# Patient Record
Sex: FEMALE | Race: WHITE | NOT HISPANIC OR LATINO | Employment: OTHER | URBAN - NONMETROPOLITAN AREA
[De-identification: names, ages, dates, MRNs, and addresses within clinical notes are randomized per-mention and may not be internally consistent; named-entity substitution may affect disease eponyms.]

---

## 2021-01-07 ENCOUNTER — HISTORICAL (OUTPATIENT)
Dept: ADMINISTRATIVE | Facility: HOSPITAL | Age: 76
End: 2021-01-07

## 2021-01-22 ENCOUNTER — HISTORICAL (OUTPATIENT)
Dept: RADIOLOGY | Facility: HOSPITAL | Age: 76
End: 2021-01-22

## 2024-03-28 ENCOUNTER — OFFICE VISIT (OUTPATIENT)
Dept: OBSTETRICS AND GYNECOLOGY | Facility: CLINIC | Age: 79
End: 2024-03-28
Payer: MEDICARE

## 2024-03-28 VITALS
HEART RATE: 74 BPM | BODY MASS INDEX: 29.32 KG/M2 | SYSTOLIC BLOOD PRESSURE: 136 MMHG | HEIGHT: 65 IN | WEIGHT: 176 LBS | DIASTOLIC BLOOD PRESSURE: 74 MMHG

## 2024-03-28 DIAGNOSIS — N81.6 RECTOCELE: ICD-10-CM

## 2024-03-28 DIAGNOSIS — K46.9 ENTEROCELE: ICD-10-CM

## 2024-03-28 DIAGNOSIS — N76.3 CHRONIC VULVITIS: ICD-10-CM

## 2024-03-28 DIAGNOSIS — N39.3 SUI (STRESS URINARY INCONTINENCE, FEMALE): ICD-10-CM

## 2024-03-28 DIAGNOSIS — N81.11 CYSTOCELE, MIDLINE: ICD-10-CM

## 2024-03-28 DIAGNOSIS — N95.2 VAGINAL ATROPHY: ICD-10-CM

## 2024-03-28 DIAGNOSIS — Z01.419 WELL WOMAN EXAM WITH ROUTINE GYNECOLOGICAL EXAM: Primary | ICD-10-CM

## 2024-03-28 PROCEDURE — G0101 CA SCREEN;PELVIC/BREAST EXAM: HCPCS | Mod: GZ,S$GLB,, | Performed by: OBSTETRICS & GYNECOLOGY

## 2024-03-28 RX ORDER — TRIAMCINOLONE ACETONIDE 1 MG/G
CREAM TOPICAL
Qty: 30 G | Refills: 0 | Status: SHIPPED | OUTPATIENT
Start: 2024-03-28

## 2024-03-28 RX ORDER — METFORMIN HYDROCHLORIDE 500 MG/1
500 TABLET ORAL
COMMUNITY
Start: 2024-03-18

## 2024-03-28 RX ORDER — BUDESONIDE AND FORMOTEROL FUMARATE DIHYDRATE 80; 4.5 UG/1; UG/1
2 AEROSOL RESPIRATORY (INHALATION)
COMMUNITY

## 2024-03-28 RX ORDER — CLOTRIMAZOLE 1 %
CREAM (GRAM) TOPICAL
Qty: 30 G | Refills: 0 | Status: SHIPPED | OUTPATIENT
Start: 2024-03-28

## 2024-03-28 RX ORDER — HYDROCODONE BITARTRATE AND ACETAMINOPHEN 10; 325 MG/1; MG/1
1 TABLET ORAL EVERY 6 HOURS PRN
COMMUNITY

## 2024-03-28 RX ORDER — FUROSEMIDE 80 MG/1
80 TABLET ORAL
COMMUNITY

## 2024-03-28 RX ORDER — ESTRADIOL 0.1 MG/D
1 FILM, EXTENDED RELEASE TRANSDERMAL
Qty: 8 PATCH | Refills: 11 | Status: SHIPPED | OUTPATIENT
Start: 2024-03-28 | End: 2025-03-28

## 2024-03-28 RX ORDER — PANTOPRAZOLE SODIUM 40 MG/1
40 TABLET, DELAYED RELEASE ORAL 2 TIMES DAILY
COMMUNITY
Start: 2024-02-05

## 2024-03-28 RX ORDER — MONTELUKAST SODIUM 10 MG/1
10 TABLET ORAL
COMMUNITY

## 2024-03-28 RX ORDER — FLUOXETINE HYDROCHLORIDE 40 MG/1
40 CAPSULE ORAL
COMMUNITY

## 2024-03-28 RX ORDER — ASPIRIN 81 MG/1
81 TABLET ORAL
COMMUNITY

## 2024-03-28 RX ORDER — PRAMIPEXOLE DIHYDROCHLORIDE 1 MG/1
1 TABLET ORAL NIGHTLY
COMMUNITY

## 2024-03-28 RX ORDER — LEVOTHYROXINE SODIUM 100 UG/1
100 TABLET ORAL
COMMUNITY

## 2024-03-28 RX ORDER — ALPRAZOLAM 0.25 MG/1
0.25 TABLET ORAL
COMMUNITY

## 2024-03-28 RX ORDER — GABAPENTIN 300 MG/1
300 CAPSULE ORAL
COMMUNITY

## 2024-03-28 RX ORDER — METOPROLOL SUCCINATE 50 MG/1
25 TABLET, EXTENDED RELEASE ORAL
COMMUNITY

## 2024-03-28 NOTE — PROGRESS NOTES
Subjective:       Patient ID: Becka Zheng is a 79 y.o. female.    Chief Complaint:  prolabsed bladder      History of Present Illness  She is doing well.  No new health issues,  No abnormal bleeding, No GI or Gu concerns,    G4   prior vag hyst.   She had tried pessaries they did not stay in and they were painful.  She notes the bladder when she is standing but if lying down she does not feel it.  She has to change positions to void.  She is active and wants to be active.  She is taking lasix 80 mg twice a day.  She is seeing a cardiologist in [place].    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   She is not sexually active as her   in        HPI      GYN & OB History  No LMP recorded (lmp unknown). Patient has had a hysterectomy.     Date of Last Pap: No result found    OB History    Para Term  AB Living   4 4 4         SAB IAB Ectopic Multiple Live Births                  # Outcome Date GA Lbr Eyad/2nd Weight Sex Delivery Anes PTL Lv   4 Term            3 Term            2 Term            1 Term                Review of Systems  Review of Systems   Constitutional:  Negative for activity change.   Eyes:  Negative for visual disturbance.   Respiratory:  Negative for shortness of breath.    Cardiovascular:  Negative for chest pain.   Gastrointestinal:  Negative for abdominal pain.   Genitourinary:  Positive for bladder incontinence. Negative for vaginal bleeding.        No abnormal vaginal bleeding   Musculoskeletal:  Negative for back pain.   Integumentary:  Negative for rash and breast mass.   Neurological:  Negative for numbness.   Psychiatric/Behavioral:          No mood disturbance or changes    Breast: Negative for mass            Objective:    Physical Exam:   Constitutional: She appears well-developed.       Cardiovascular:  Normal rate.             Pulmonary/Chest:  Breath sounds normal.        Abdominal: Soft.     Genitourinary:          Pelvic exam was performed with patient supine.   The external female genitalia was normal.   There is rectocele (second degree), cystocele (through the vagina introitus) and unspecified prolapse of vaginal walls (she has an enterocele as well) in the vagina. Cervix is absent.Uterus is absent. Urethra findings: prolapsed (hyper mobile urethra)     Genitourinary Comments: Perianal rash,  she also has a skin tag that is 1-2 cm                          Assessment:        1. Well woman exam with routine gynecological exam    2. Chronic vulvitis    3. Rectocele    4. Cystocele, midline    5. Enterocele    6. TANNA (stress urinary incontinence, female)               Plan:             Pap done    discussed the surgery     anterior and posterior repair with axis dermis and a sacrospinous ligament fixation and mid urethra sling at [facility]   Mammogram  Follow up one year or sooner if needed  Self breast exams  Consider health profile if labs not done with PCP  Recommend colonoscopy as indicated  Bone density discussed   Pt is aware we call all results. If she does not hear from our office regarding her result within a week of having a study or procedure performed she is to call the office so that we can research the result for her as I do not know when she is scheduling her procedures.   Chaperone was present

## 2024-04-03 LAB — Lab: NORMAL

## 2024-06-03 ENCOUNTER — TELEPHONE (OUTPATIENT)
Dept: OBSTETRICS AND GYNECOLOGY | Facility: CLINIC | Age: 79
End: 2024-06-03
Payer: MEDICARE

## 2024-06-03 DIAGNOSIS — N39.3 SUI (STRESS URINARY INCONTINENCE, FEMALE): ICD-10-CM

## 2024-06-03 DIAGNOSIS — N81.11 CYSTOCELE, MIDLINE: Primary | ICD-10-CM

## 2024-06-03 DIAGNOSIS — N81.6 RECTOCELE: ICD-10-CM

## 2024-06-03 DIAGNOSIS — N76.3 CHRONIC VULVITIS: ICD-10-CM

## 2024-06-03 DIAGNOSIS — K46.9 ENTEROCELE: ICD-10-CM

## 2024-06-03 DIAGNOSIS — N95.2 VAGINAL ATROPHY: ICD-10-CM

## 2024-12-19 DIAGNOSIS — M48.062 SPINAL STENOSIS, LUMBAR REGION, WITH NEUROGENIC CLAUDICATION: Primary | ICD-10-CM

## 2025-04-01 ENCOUNTER — HOSPITAL ENCOUNTER (OUTPATIENT)
Dept: RADIOLOGY | Facility: HOSPITAL | Age: 80
Discharge: HOME | End: 2025-04-01
Attending: ORTHOPAEDIC SURGERY
Payer: MEDICARE

## 2025-04-01 DIAGNOSIS — M48.062 SPINAL STENOSIS, LUMBAR REGION, WITH NEUROGENIC CLAUDICATION: ICD-10-CM

## 2025-04-01 NOTE — PROGRESS NOTES
I was not able to do Ms. Zheng's MRI today due to pacemaker battery  needing to be changed per [employer] rep. Patient will reschedule MRI one battery for pacemaker is changed.

## 2025-07-31 ENCOUNTER — HOSPITAL ENCOUNTER (OUTPATIENT)
Dept: RADIOLOGY | Facility: HOSPITAL | Age: 80
Discharge: HOME | End: 2025-07-31
Attending: ORTHOPAEDIC SURGERY
Payer: MEDICARE

## 2025-07-31 PROCEDURE — 72148 MRI LUMBAR SPINE W/O DYE: CPT | Mod: TC
